# Patient Record
Sex: MALE | Race: WHITE | HISPANIC OR LATINO | URBAN - METROPOLITAN AREA
[De-identification: names, ages, dates, MRNs, and addresses within clinical notes are randomized per-mention and may not be internally consistent; named-entity substitution may affect disease eponyms.]

---

## 2022-05-10 ENCOUNTER — EMERGENCY (EMERGENCY)
Facility: HOSPITAL | Age: 37
LOS: 1 days | Discharge: HOME | End: 2022-05-10
Admitting: EMERGENCY MEDICINE
Payer: MEDICAID

## 2022-05-10 VITALS
SYSTOLIC BLOOD PRESSURE: 122 MMHG | TEMPERATURE: 98 F | HEART RATE: 106 BPM | DIASTOLIC BLOOD PRESSURE: 60 MMHG | RESPIRATION RATE: 18 BRPM | OXYGEN SATURATION: 98 %

## 2022-05-10 VITALS
SYSTOLIC BLOOD PRESSURE: 128 MMHG | DIASTOLIC BLOOD PRESSURE: 84 MMHG | OXYGEN SATURATION: 98 % | HEART RATE: 113 BPM | RESPIRATION RATE: 18 BRPM | TEMPERATURE: 98 F

## 2022-05-10 DIAGNOSIS — Z90.49 ACQUIRED ABSENCE OF OTHER SPECIFIED PARTS OF DIGESTIVE TRACT: Chronic | ICD-10-CM

## 2022-05-10 PROCEDURE — 99283 EMERGENCY DEPT VISIT LOW MDM: CPT | Mod: 25

## 2022-05-10 RX ORDER — ACETAMINOPHEN 500 MG
975 TABLET ORAL ONCE
Refills: 0 | Status: COMPLETED | OUTPATIENT
Start: 2022-05-10 | End: 2022-05-10

## 2022-05-10 RX ADMIN — Medication 300 MILLIGRAM(S): at 01:34

## 2022-05-10 RX ADMIN — Medication 500 MILLIGRAM(S): at 01:34

## 2022-05-10 RX ADMIN — Medication 975 MILLIGRAM(S): at 01:34

## 2022-05-10 NOTE — ED PROVIDER NOTE - CARE PLAN
Principal Discharge DX:	Wound infection   1 Principal Discharge DX:	Wound infection  Secondary Diagnosis:	Infected blister

## 2022-05-10 NOTE — ED PROVIDER NOTE - PATIENT PORTAL LINK FT
You can access the FollowMyHealth Patient Portal offered by [facility] by registering at the following website: [email or web address]. By joining [employer]’s FollowMyHealth portal, you will also be able to view your health information using other applications (apps) compatible with our system.

## 2022-05-10 NOTE — ED PROCEDURE NOTE - CPROC ED LOCAL ANESTHESIA1
ethyl chloride I have personally evaluated and examined the patient. The Attending was available to me as a supervising provider if needed.

## 2022-05-10 NOTE — ED PROVIDER NOTE - PHYSICAL EXAMINATION
Gen - WDWN M, NAD, comfortable and non-toxic appearing  Skin - warm, dry, large blister to the plantar aspect of the 2nd toe, not crossing MTP region, with slight opening to the medial aspect with mucopurulent dc, no active bleeding, or bony/tendon exposure noted   HEENT - AT/NC, no nasal discharge, airway patent, neck supple and FROM  CV - S1S2, rapid rate, regular rhythm   Resp - CTAB, no r/r/w  GI - soft, ND, NT, no CVAT b/l   MS - R 2nd toe with mild edema and TTP over the plantar aspect of the distal phalanx, not crossing MTP, no streaking, fluctuance, crepitus, induration, warmth, or desquamation of the skin, NV intact, FROM, +SILT, brisk cap refill, symmetric distal pulses b/l, No midline spinal tenderness or step off on palpation  Neuro - AxOx3, ambulatory without gait disturbance

## 2022-05-10 NOTE — ED PROVIDER NOTE - NSFOLLOWUPCLINICS_GEN_ALL_ED_FT
[facility] Podiatry [facility]  Podiatry  [address] [place] [address]  Phone: [phone]  Fax:

## 2022-05-10 NOTE — ED PROVIDER NOTE - OBJECTIVE STATEMENT
[age] yo M with PMHx of HIV, last CD4 unknown, VL undetectable, on HAART, undomiciled, presenting c/o worsening R 2nd toe pain x 1d. Pt reports prolonged walking in the past few days, developed a blister to the bottom of the R 2nd toe 2d ago and noted progressive worsening pain, swelling and purulent dc today.  Denies fever, chills, FB sensation, change in ROM/sensation, paresthesia, direct trauma, fall, N/V, HA, dizziness, LOC, CP, SOB, itching, rash, and focal weakness

## 2022-05-10 NOTE — ED PROVIDER NOTE - NSFOLLOWUPINSTRUCTIONS_ED_ALL_ED_FT
Wound Infection      A wound infection happens when germs start to grow in a wound. Germs that cause wound infections are most often bacteria. Other types of infections can occur as well. An infection can cause the wound to break open. Wound infections need treatment. If a wound infection is not treated, problems can happen.      What are the causes?    •Most often caused by germs (bacteria) that grow in a wound.      •Other germs, such as yeast and funguses, can also cause wound infections.        What increases the risk?    •Having a weak body defense system (immune system).      •Having diabetes.      •Taking certain medicines (steroids) for a long time.      •Smoking.      •Being an older person.      •Being overweight.      •Taking certain medicines for cancer treatment.        What are the signs or symptoms?    •Having more redness, swelling, or pain at the wound site.      •Having more blood or fluid at the wound site.      •A bad smell coming from a wound or bandage (dressing).      •Having a fever.      •Feeling very tired.      •Having warmth at or around the wound.      •Having pus at the wound site.        How is this treated?  •This condition is most often treated with an antibiotic medicine.  •The infection should improve 24–48 hours after you start antibiotics.      •After 24–48 hours, redness around the wound should stop spreading. The wound should also be less painful.          Follow these instructions at home:    Medicines     •Take or apply over-the-counter and prescription medicines only as told by your doctor.      •If you were prescribed an antibiotic medicine, take or apply it as told by your doctor. Do not stop using the antibiotic even if you start to feel better.        Wound care    •Clean the wound each day, or as told by your doctor.  •Wash the wound with mild soap and water.      •Rinse the wound with water to remove all soap.      •Pat the wound dry with a clean towel. Do not rub it.      •Follow instructions from your doctor about how to take care of your wound. Make sure you:  •Wash your hands with soap and water before and after you change your bandage. If you cannot use soap and water, use hand .      •Change your bandage as told by your doctor.      •Leave stitches (sutures), skin glue, or skin tape (adhesive) strips in place if your wound has been closed. They may need to stay in place for 2 weeks or longer. If tape strips get loose and curl up, you may trim the loose edges. Do not remove tape strips completely unless your doctor says it is okay. Some wounds are left open to heal on their own.      •Check your wound every day for signs of infection. Watch for:  •More redness, swelling, or pain.      •More fluid or blood.      •Warmth.      •Pus or a bad smell.        General instructions     •Keep the bandage dry until your doctor says it can be removed.      • Do not take baths, swim, or use a hot tub until your doctor approves. Ask your doctor if you may take showers. You may only be allowed to take sponge baths.      •Raise (elevate) the injured area above the level of your heart while you are sitting or lying down.      • Do not scratch or pick at the wound.      •Keep all follow-up visits as told by your doctor. This is important.        Contact a doctor if:    •Medicine does not help your pain.      •You have more redness, swelling, or pain around your wound.      •You have more fluid or blood coming from your wound.      •Your wound feels warm to the touch.      •You have pus coming from your wound.      •You notice a bad smell coming from your wound or your bandage.      •Your wound that was closed breaks open.        Get help right away if:    •You have a red streak going away from your wound.      •You have a fever.        Summary    •A wound infection happens when germs start to grow in a wound.      •This condition is usually treated with an antibiotic medicine.      •Follow instructions from your doctor about how to take care of your wound.      •Contact a doctor if your wound infection does not start to get better in 24–48 hours, or your symptoms get worse.      •Keep all follow-up visits as told by your doctor. This is important

## 2022-05-10 NOTE — ED PROVIDER NOTE - CHIEF COMPLAINT
The patient is a 36y Male complaining of toe pain.

## 2022-05-10 NOTE — ED ADULT TRIAGE NOTE - CHIEF COMPLAINT QUOTE
Pt undomiciled, BIBA for toe pain to right foot second digit. Pt states "I walk a lot and I think my toe has a bad blister on the bottom". Pt denies fevers.

## 2022-05-10 NOTE — ED PROCEDURE NOTE - GENERAL PROCEDURE DETAILS
wound edges slightly debrided with sterile scissors and forcep, wound milked and expressed with mucupurulent dc, wound cultured and irrigated with NS and betadine, xeroform and DSD dressing applied, NV intact pre and post wound care, no active bleeding noted

## 2022-05-10 NOTE — ED PROVIDER NOTE - CARE PROVIDER_API CALL
Tatiana Camp)  Infectious Disease; Internal Medicine  [address]  [place], [place] [address]  Phone: [phone]  Fax: [phone]  Follow Up Time:
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
## 2022-05-11 DIAGNOSIS — Z21 ASYMPTOMATIC HUMAN IMMUNODEFICIENCY VIRUS [HIV] INFECTION STATUS: ICD-10-CM

## 2022-05-11 DIAGNOSIS — S90.424A BLISTER (NONTHERMAL), RIGHT LESSER TOE(S), INITIAL ENCOUNTER: ICD-10-CM

## 2022-05-11 DIAGNOSIS — Z59.00 HOMELESSNESS UNSPECIFIED: ICD-10-CM
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
## 2022-05-11 DIAGNOSIS — M79.674 PAIN IN RIGHT TOE(S): ICD-10-CM
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
## 2022-05-11 DIAGNOSIS — L08.9 LOCAL INFECTION OF THE SKIN AND SUBCUTANEOUS TISSUE, UNSPECIFIED: ICD-10-CM

## 2022-05-11 SDOH — ECONOMIC STABILITY - HOUSING INSECURITY: HOMELESSNESS UNSPECIFIED: Z59.00

## 2022-05-12 LAB
-  AMPICILLIN/SULBACTAM: SIGNIFICANT CHANGE UP
-  CEFAZOLIN: SIGNIFICANT CHANGE UP
-  CLINDAMYCIN: SIGNIFICANT CHANGE UP
-  DAPTOMYCIN: SIGNIFICANT CHANGE UP
-  ERYTHROMYCIN: SIGNIFICANT CHANGE UP
-  GENTAMICIN: SIGNIFICANT CHANGE UP
-  LINEZOLID: SIGNIFICANT CHANGE UP
-  OXACILLIN: SIGNIFICANT CHANGE UP
-  PENICILLIN: SIGNIFICANT CHANGE UP
-  RIFAMPIN: SIGNIFICANT CHANGE UP
-  TETRACYCLINE: SIGNIFICANT CHANGE UP
-  TRIMETHOPRIM/SULFAMETHOXAZOLE: SIGNIFICANT CHANGE UP
-  VANCOMYCIN: SIGNIFICANT CHANGE UP
METHOD TYPE: SIGNIFICANT CHANGE UP

## 2022-05-13 NOTE — ED POST DISCHARGE NOTE - RESULT SUMMARY
wound culture + MRSA on clinda sensitivities pending wound culture + MRSA on clinda, sensitive to Clinda

## 2022-05-17 LAB
CULTURE RESULTS: SIGNIFICANT CHANGE UP
ORGANISM # SPEC MICROSCOPIC CNT: SIGNIFICANT CHANGE UP
ORGANISM # SPEC MICROSCOPIC CNT: SIGNIFICANT CHANGE UP
SPECIMEN SOURCE: SIGNIFICANT CHANGE UP

## 2022-06-01 ENCOUNTER — EMERGENCY (EMERGENCY)
Facility: HOSPITAL | Age: 37
LOS: 1 days | Discharge: LEFT BEFORE DISCHARGE | End: 2022-06-01
Admitting: EMERGENCY MEDICINE
Payer: MEDICAID

## 2022-06-01 VITALS
RESPIRATION RATE: 18 BRPM | WEIGHT: 130.07 LBS | OXYGEN SATURATION: 100 % | TEMPERATURE: 98 F | SYSTOLIC BLOOD PRESSURE: 119 MMHG | HEART RATE: 97 BPM | DIASTOLIC BLOOD PRESSURE: 73 MMHG

## 2022-06-01 DIAGNOSIS — Z90.49 ACQUIRED ABSENCE OF OTHER SPECIFIED PARTS OF DIGESTIVE TRACT: Chronic | ICD-10-CM

## 2022-06-01 PROBLEM — B20 HUMAN IMMUNODEFICIENCY VIRUS [HIV] DISEASE: Chronic | Status: ACTIVE | Noted: 2022-05-10

## 2022-06-01 PROCEDURE — L9991: CPT

## 2022-06-01 NOTE — ED ADULT TRIAGE NOTE - CHIEF COMPLAINT QUOTE
[place] Pt endorses LOC and head injury. Pt also c/o lower abdominal pain, nausea and diarrhea. Pt denies blood thinners, alcohol/drug use today. Last used meth 5 days prior. EKG and FS initiated.

## 2022-06-03 DIAGNOSIS — R55 SYNCOPE AND COLLAPSE: ICD-10-CM
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
## 2023-01-13 NOTE — ED ADULT NURSE NOTE - NS ED NURSE DC INFO COMPLEXITY
01/13/23 1043   Wound Leg lower Right 12/09/22   Date First Assessed: 12/09/22   Present on Hospital Admission: Yes  Wound Approximate Age at First Assessment (Weeks): 36 weeks  Primary Wound Type: Traumatic  Location: Leg lower  Wound Location Orientation: Right  Date of First Observation: 12/09/22   Wound Image    Wound Etiology Traumatic   Dressing Status Clean;Dry; Intact   Cleansed Cleansed with saline   Wound Length (cm) 1 cm   Wound Width (cm) 1.4 cm   Wound Depth (cm) 0.1 cm   Wound Surface Area (cm^2) 1.4 cm^2   Change in Wound Size % 37.78   Wound Volume (cm^3) 0.14 cm^3   Wound Healing % 38   Wound Assessment Slough;Pink/red   Drainage Amount Small   Drainage Description Serosanguinous   Wound Odor None   Susan-Wound/Incision Assessment Blanchable erythema   Edges Flat/open edges   Wound Thickness Description Full thickness   Pain 1   Pain Scale 1 Numeric (0 - 10)   Pain Intensity 1 0   Visit Vitals  /78 (BP 1 Location: Left upper arm, BP Patient Position: At rest;Sitting)   Pulse 72   Temp 97.8 °F (36.6 °C) Simple: Patient demonstrates quick and easy understanding/Verbalized Understanding